# Patient Record
Sex: FEMALE | Race: WHITE | URBAN - METROPOLITAN AREA
[De-identification: names, ages, dates, MRNs, and addresses within clinical notes are randomized per-mention and may not be internally consistent; named-entity substitution may affect disease eponyms.]

---

## 2023-11-08 ENCOUNTER — OFFICE VISIT (OUTPATIENT)
Age: 28
End: 2023-11-08

## 2023-11-08 VITALS
WEIGHT: 221 LBS | DIASTOLIC BLOOD PRESSURE: 82 MMHG | TEMPERATURE: 98 F | OXYGEN SATURATION: 98 % | BODY MASS INDEX: 34.69 KG/M2 | HEIGHT: 67 IN | RESPIRATION RATE: 18 BRPM | HEART RATE: 91 BPM | SYSTOLIC BLOOD PRESSURE: 155 MMHG

## 2023-11-08 DIAGNOSIS — K08.89 PAIN, DENTAL: Primary | ICD-10-CM

## 2023-11-08 RX ORDER — AMOXICILLIN 500 MG/1
500 CAPSULE ORAL 3 TIMES DAILY
Qty: 21 CAPSULE | Refills: 0 | Status: SHIPPED | OUTPATIENT
Start: 2023-11-08 | End: 2023-11-15

## 2025-06-21 ENCOUNTER — OFFICE VISIT (OUTPATIENT)
Age: 30
End: 2025-06-21

## 2025-06-21 VITALS
DIASTOLIC BLOOD PRESSURE: 77 MMHG | SYSTOLIC BLOOD PRESSURE: 123 MMHG | OXYGEN SATURATION: 95 % | HEART RATE: 81 BPM | WEIGHT: 246 LBS | BODY MASS INDEX: 38.53 KG/M2
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
## 2025-06-21 DIAGNOSIS — L08.9 BUG BITE OF FACE WITH INFECTION, INITIAL ENCOUNTER: ICD-10-CM

## 2025-06-21 DIAGNOSIS — L03.115 CELLULITIS OF RIGHT LOWER EXTREMITY: Primary | ICD-10-CM

## 2025-06-21 DIAGNOSIS — S00.86XA BUG BITE OF FACE WITH INFECTION, INITIAL ENCOUNTER: ICD-10-CM

## 2025-06-21 RX ORDER — CEPHALEXIN 500 MG/1
500 CAPSULE ORAL 4 TIMES DAILY
Qty: 40 CAPSULE | Refills: 0 | Status: SHIPPED | OUTPATIENT
Start: 2025-06-21 | End: 2025-07-01

## 2025-06-21 RX ORDER — SULFAMETHOXAZOLE AND TRIMETHOPRIM 800; 160 MG/1; MG/1
1 TABLET ORAL 2 TIMES DAILY
Qty: 20 TABLET | Refills: 0 | Status: SHIPPED | OUTPATIENT
Start: 2025-06-21 | End: 2025-07-01

## 2025-06-21 NOTE — PATIENT INSTRUCTIONS
You have been diagnosed with cellulitis, a skin infection typically caused by bacteria. Take Keflex four times a day for 10 days and bactrim DS twice a day for 10 days as prescribed. It's important to take the medication at evenly spaced intervals and complete the full course, even if you start to feel better. Keep the affected area clean and dry, and avoid scratching or applying any creams unless directed. Monitor for signs of worsening infection such as increased redness, swelling, warmth, pain, pus, or fever. If symptoms worsen or do not improve within 2-3 days. Contact your healthcare provider.